# Patient Record
Sex: FEMALE | Race: WHITE | NOT HISPANIC OR LATINO | Employment: STUDENT | URBAN - METROPOLITAN AREA
[De-identification: names, ages, dates, MRNs, and addresses within clinical notes are randomized per-mention and may not be internally consistent; named-entity substitution may affect disease eponyms.]

---

## 2023-06-19 PROBLEM — Z86.39 HISTORY OF EARLY MENARCHE: Status: ACTIVE | Noted: 2023-06-19

## 2023-06-19 PROBLEM — E78.5 HYPERLIPIDEMIA: Status: ACTIVE | Noted: 2023-06-19

## 2023-06-19 PROBLEM — U07.1 COVID-19 VIRUS DETECTED: Status: ACTIVE | Noted: 2023-06-19

## 2023-06-19 PROBLEM — L20.9 ATOPIC DERMATITIS: Status: ACTIVE | Noted: 2023-06-19

## 2023-06-19 PROBLEM — J30.9 ALLERGIC RHINITIS: Status: ACTIVE | Noted: 2023-06-19

## 2023-07-13 PROBLEM — U07.1 COVID-19 VIRUS DETECTED: Status: RESOLVED | Noted: 2023-06-19 | Resolved: 2023-07-13

## 2023-07-13 RX ORDER — NORETHINDRONE AND ETHINYL ESTRADIOL 1 MG-35MCG
1 KIT ORAL DAILY
COMMUNITY
Start: 2020-09-28 | End: 2023-07-14 | Stop reason: SDUPTHER

## 2023-07-14 ENCOUNTER — OFFICE VISIT (OUTPATIENT)
Dept: PEDIATRICS | Facility: CLINIC | Age: 18
End: 2023-07-14
Payer: COMMERCIAL

## 2023-07-14 VITALS
WEIGHT: 112.9 LBS | DIASTOLIC BLOOD PRESSURE: 71 MMHG | SYSTOLIC BLOOD PRESSURE: 113 MMHG | BODY MASS INDEX: 18.14 KG/M2 | HEIGHT: 66 IN | HEART RATE: 65 BPM

## 2023-07-14 DIAGNOSIS — Z00.121 ENCOUNTER FOR ROUTINE CHILD HEALTH EXAMINATION WITH ABNORMAL FINDINGS: Primary | ICD-10-CM

## 2023-07-14 DIAGNOSIS — R61 HYPERHIDROSIS: ICD-10-CM

## 2023-07-14 DIAGNOSIS — K59.09 CHRONIC CONSTIPATION: ICD-10-CM

## 2023-07-14 PROCEDURE — 3008F BODY MASS INDEX DOCD: CPT | Performed by: PEDIATRICS

## 2023-07-14 PROCEDURE — 99395 PREV VISIT EST AGE 18-39: CPT | Performed by: PEDIATRICS

## 2023-07-14 PROCEDURE — 1036F TOBACCO NON-USER: CPT | Performed by: PEDIATRICS

## 2023-07-14 RX ORDER — NORETHINDRONE AND ETHINYL ESTRADIOL 1 MG-35MCG
1 KIT ORAL DAILY
Qty: 28 TABLET | Refills: 12 | Status: SHIPPED | OUTPATIENT
Start: 2023-07-14

## 2023-07-14 RX ORDER — PHENOL 1.4 %
AEROSOL, SPRAY (ML) MUCOUS MEMBRANE
COMMUNITY

## 2023-07-14 RX ORDER — POLYETHYLENE GLYCOL 3350 17 G/17G
17 POWDER, FOR SOLUTION ORAL DAILY PRN
Qty: 527 G | Refills: 2
Start: 2023-07-14 | End: 2023-10-15

## 2023-07-14 ASSESSMENT — PATIENT HEALTH QUESTIONNAIRE - PHQ9
2. FEELING DOWN, DEPRESSED OR HOPELESS: NOT AT ALL
1. LITTLE INTEREST OR PLEASURE IN DOING THINGS: NOT AT ALL
SUM OF ALL RESPONSES TO PHQ9 QUESTIONS 1 AND 2: 0

## 2023-07-14 NOTE — PROGRESS NOTES
"Link Bermudez is here her annual well visit.    Questions or concerns:  Constipation    Nutrition, Elimination, and Sleep:  Nutrition:  well-balanced diet, eats foods from each food group  Elimination:  She has a tendency to get constipated.  Sleep:  adequate, no snoring identified    Currently menstruating? yes; current menstrual pattern: regular every month without intermenstrual spotting    Social:  Peer relations:  no concerns  Family relations:  no concerns  School performance:  no concerns  Teen questionnaire:  reviewed  Activities:  work    Objective   /71   Pulse 65   Ht 1.686 m (5' 6.38\")   Wt 51.2 kg (112 lb 14.4 oz)   BMI 18.01 kg/m²   Physical Exam  Vitals reviewed.   Constitutional:       General: She is not in acute distress.     Appearance: Normal appearance. She is not ill-appearing.   HENT:      Head: Normocephalic and atraumatic.      Right Ear: Tympanic membrane, ear canal and external ear normal.      Left Ear: Tympanic membrane, ear canal and external ear normal.      Nose: Nose normal.      Mouth/Throat:      Mouth: Mucous membranes are moist.      Pharynx: Oropharynx is clear.   Eyes:      Extraocular Movements: Extraocular movements intact.      Conjunctiva/sclera: Conjunctivae normal.      Pupils: Pupils are equal, round, and reactive to light.   Neck:      Thyroid: No thyroid mass or thyromegaly.   Cardiovascular:      Rate and Rhythm: Normal rate and regular rhythm.      Pulses: Normal pulses.      Heart sounds: Normal heart sounds. No murmur heard.     No gallop.   Pulmonary:      Effort: Pulmonary effort is normal. No respiratory distress.      Breath sounds: Normal breath sounds.   Chest:   Breasts:     Eleuterio Score is 5.   Abdominal:      General: There is no distension.      Palpations: Abdomen is soft. There is no hepatomegaly, splenomegaly or mass.      Tenderness: There is no abdominal tenderness.      Hernia: No hernia is present.   Genitourinary:     Eleuterio " stage (genital): 5.   Musculoskeletal:         General: No swelling, deformity or signs of injury. Normal range of motion.      Cervical back: Normal range of motion and neck supple.      Thoracic back: No scoliosis.   Lymphadenopathy:      Comments: no significant lymphadenopathy > 1 cm   Skin:     General: Skin is warm and dry.   Neurological:      General: No focal deficit present.      Motor: No weakness.   Psychiatric:         Mood and Affect: Mood normal.         Thought Content: Thought content normal.          Assessment/Plan   1. Encounter for routine child health examination with abnormal findings  Alyacen 1/35, 28, 1-35 mg-mcg tablet      2. Body mass index less than 19        3. Chronic constipation        4. Hyperhidrosis  aluminum chloride (Drysol) 20 % external solution         Lara is a healthy and thriving adult.  - Follow-up in 1 month if constipation is not improving with use of Miralax  - Guidance regarding safety, nutrition, physical activity, and sleep reviewed.  - Social:  teenage questionnaire completed and reviewed.  Issues of smoking, vaping, substance use, sexuality, and mood discussed.    - Vaccines:  as documented  - Return in 1 year for annual well exam or sooner if concerns arise; GYN visit next year as well

## 2023-07-14 NOTE — PATIENT INSTRUCTIONS
Discussed proper use of Miralax, weaning as tolerated after having a soft, painless, BM daily for at least a month  Sitting routine with appropriately fit seat and feet on floor or foot rest x 10 min, ~ 20 min after meals once - twice daily  Increase fiber and water in diet  Follow-up in 1 month for recheck if needed

## 2023-07-27 DIAGNOSIS — Z00.121 ENCOUNTER FOR ROUTINE CHILD HEALTH EXAMINATION WITH ABNORMAL FINDINGS: ICD-10-CM

## 2023-07-27 RX ORDER — NORETHINDRONE AND ETHINYL ESTRADIOL 1 MG-35MCG
1 KIT ORAL DAILY
Qty: 28 TABLET | Refills: 11 | OUTPATIENT
Start: 2023-07-27

## 2024-01-25 ENCOUNTER — OFFICE VISIT (OUTPATIENT)
Dept: PEDIATRICS | Facility: CLINIC | Age: 19
End: 2024-01-25
Payer: COMMERCIAL

## 2024-01-25 VITALS — BODY MASS INDEX: 19.52 KG/M2 | TEMPERATURE: 98.1 F | WEIGHT: 122.31 LBS

## 2024-01-25 DIAGNOSIS — J01.90 ACUTE NON-RECURRENT SINUSITIS, UNSPECIFIED LOCATION: ICD-10-CM

## 2024-01-25 DIAGNOSIS — H66.003 NON-RECURRENT ACUTE SUPPURATIVE OTITIS MEDIA OF BOTH EARS WITHOUT SPONTANEOUS RUPTURE OF TYMPANIC MEMBRANES: Primary | ICD-10-CM

## 2024-01-25 PROCEDURE — 3008F BODY MASS INDEX DOCD: CPT | Performed by: PEDIATRICS

## 2024-01-25 PROCEDURE — 1036F TOBACCO NON-USER: CPT | Performed by: PEDIATRICS

## 2024-01-25 PROCEDURE — 99213 OFFICE O/P EST LOW 20 MIN: CPT | Performed by: PEDIATRICS

## 2024-01-25 RX ORDER — CEFDINIR 300 MG/1
300 CAPSULE ORAL 2 TIMES DAILY
Qty: 20 CAPSULE | Refills: 0 | Status: SHIPPED | OUTPATIENT
Start: 2024-01-25 | End: 2024-02-04

## 2024-01-25 NOTE — PROGRESS NOTES
Lara Contreras is a 18 y.o. who presents for Earache.      HPI  Nasal congestion for about a week.  New onset of right ear pain over the last day.  Intermittent tactile fever.    Objective   Temp 36.7 °C (98.1 °F)   Wt 55.5 kg (122 lb 5 oz)   BMI 19.52 kg/m²     Physical Exam  Constitutional:       Appearance: Normal appearance.   HENT:      Ears:      Comments: Both Tms red, purulent effusions     Nose: Congestion present.      Mouth/Throat:      Mouth: Mucous membranes are moist.   Eyes:      Conjunctiva/sclera: Conjunctivae normal.   Cardiovascular:      Rate and Rhythm: Normal rate and regular rhythm.      Heart sounds: Normal heart sounds.   Pulmonary:      Effort: Pulmonary effort is normal.      Breath sounds: Normal breath sounds.   Abdominal:      General: Bowel sounds are normal.      Tenderness: There is no abdominal tenderness.   Musculoskeletal:      Cervical back: Normal range of motion and neck supple.   Neurological:      Mental Status: She is alert.         Assessment/Plan   Lara has bilateral ear infections and I suspect she may have sinusitis as well.  She was prescribed Cefdinir.  Today we discussed a typical course of illness, symptomatic treatment, and signs of worsening/when to seek medical care.

## 2024-07-15 ENCOUNTER — APPOINTMENT (OUTPATIENT)
Dept: PEDIATRICS | Facility: CLINIC | Age: 19
End: 2024-07-15
Payer: COMMERCIAL

## 2024-07-21 DIAGNOSIS — Z00.121 ENCOUNTER FOR ROUTINE CHILD HEALTH EXAMINATION WITH ABNORMAL FINDINGS: ICD-10-CM

## 2024-07-22 RX ORDER — NORETHINDRONE AND ETHINYL ESTRADIOL 1 MG-35MCG
1 KIT ORAL DAILY
Qty: 28 TABLET | Refills: 12 | Status: SHIPPED | OUTPATIENT
Start: 2024-07-22

## 2024-08-07 ENCOUNTER — APPOINTMENT (OUTPATIENT)
Dept: PEDIATRICS | Facility: CLINIC | Age: 19
End: 2024-08-07
Payer: COMMERCIAL

## 2024-08-10 ENCOUNTER — OFFICE VISIT (OUTPATIENT)
Dept: PEDIATRICS | Facility: CLINIC | Age: 19
End: 2024-08-10
Payer: COMMERCIAL

## 2024-08-10 ENCOUNTER — LAB (OUTPATIENT)
Dept: LAB | Facility: LAB | Age: 19
End: 2024-08-10
Payer: COMMERCIAL

## 2024-08-10 VITALS
BODY MASS INDEX: 17.66 KG/M2 | HEIGHT: 67 IN | SYSTOLIC BLOOD PRESSURE: 111 MMHG | WEIGHT: 112.5 LBS | DIASTOLIC BLOOD PRESSURE: 68 MMHG | HEART RATE: 73 BPM

## 2024-08-10 DIAGNOSIS — E78.01 FAMILIAL HYPERCHOLESTEROLEMIA: ICD-10-CM

## 2024-08-10 DIAGNOSIS — Z00.01 ENCOUNTER FOR GENERAL ADULT MEDICAL EXAMINATION WITH ABNORMAL FINDINGS: ICD-10-CM

## 2024-08-10 DIAGNOSIS — Z23 ENCOUNTER FOR IMMUNIZATION: ICD-10-CM

## 2024-08-10 DIAGNOSIS — Z00.01 ENCOUNTER FOR GENERAL ADULT MEDICAL EXAMINATION WITH ABNORMAL FINDINGS: Primary | ICD-10-CM

## 2024-08-10 PROBLEM — L20.9 ATOPIC DERMATITIS: Status: RESOLVED | Noted: 2023-06-19 | Resolved: 2024-08-10

## 2024-08-10 LAB
CHOLEST SERPL-MCNC: 212 MG/DL (ref 0–199)
CHOLESTEROL/HDL RATIO: 2.3
ERYTHROCYTE [DISTWIDTH] IN BLOOD BY AUTOMATED COUNT: 12.3 % (ref 11.5–14.5)
HCT VFR BLD AUTO: 42.8 % (ref 36–46)
HDLC SERPL-MCNC: 92.6 MG/DL
HGB BLD-MCNC: 13.3 G/DL (ref 12–16)
LDLC SERPL CALC-MCNC: 91 MG/DL
MCH RBC QN AUTO: 28.1 PG (ref 26–34)
MCHC RBC AUTO-ENTMCNC: 31.1 G/DL (ref 32–36)
MCV RBC AUTO: 90 FL (ref 80–100)
NON HDL CHOLESTEROL: 119 MG/DL (ref 0–149)
NRBC BLD-RTO: 0 /100 WBCS (ref 0–0)
PLATELET # BLD AUTO: 280 X10*3/UL (ref 150–450)
RBC # BLD AUTO: 4.74 X10*6/UL (ref 4–5.2)
TRIGL SERPL-MCNC: 142 MG/DL (ref 0–149)
VLDL: 28 MG/DL (ref 0–40)
WBC # BLD AUTO: 6.7 X10*3/UL (ref 4.4–11.3)

## 2024-08-10 PROCEDURE — 90715 TDAP VACCINE 7 YRS/> IM: CPT | Performed by: PEDIATRICS

## 2024-08-10 PROCEDURE — 36415 COLL VENOUS BLD VENIPUNCTURE: CPT

## 2024-08-10 PROCEDURE — 99395 PREV VISIT EST AGE 18-39: CPT | Performed by: PEDIATRICS

## 2024-08-10 PROCEDURE — 90471 IMMUNIZATION ADMIN: CPT | Performed by: PEDIATRICS

## 2024-08-10 PROCEDURE — 96127 BRIEF EMOTIONAL/BEHAV ASSMT: CPT | Performed by: PEDIATRICS

## 2024-08-10 PROCEDURE — 3008F BODY MASS INDEX DOCD: CPT | Performed by: PEDIATRICS

## 2024-08-10 PROCEDURE — 85027 COMPLETE CBC AUTOMATED: CPT

## 2024-08-10 PROCEDURE — 1036F TOBACCO NON-USER: CPT | Performed by: PEDIATRICS

## 2024-08-10 PROCEDURE — 80061 LIPID PANEL: CPT

## 2024-08-10 RX ORDER — CETIRIZINE HYDROCHLORIDE 10 MG/1
10 TABLET ORAL DAILY PRN
COMMUNITY

## 2024-08-10 ASSESSMENT — PATIENT HEALTH QUESTIONNAIRE - PHQ9
5. POOR APPETITE OR OVEREATING: NOT AT ALL
6. FEELING BAD ABOUT YOURSELF - OR THAT YOU ARE A FAILURE OR HAVE LET YOURSELF OR YOUR FAMILY DOWN: NOT AT ALL
9. THOUGHTS THAT YOU WOULD BE BETTER OFF DEAD, OR OF HURTING YOURSELF: NOT AT ALL
3. TROUBLE FALLING OR STAYING ASLEEP: NOT AT ALL
4. FEELING TIRED OR HAVING LITTLE ENERGY: NOT AT ALL
7. TROUBLE CONCENTRATING ON THINGS, SUCH AS READING THE NEWSPAPER OR WATCHING TELEVISION: NOT AT ALL
8. MOVING OR SPEAKING SO SLOWLY THAT OTHER PEOPLE COULD HAVE NOTICED. OR THE OPPOSITE, BEING SO FIGETY OR RESTLESS THAT YOU HAVE BEEN MOVING AROUND A LOT MORE THAN USUAL: NOT AT ALL
1. LITTLE INTEREST OR PLEASURE IN DOING THINGS: NOT AT ALL
1. LITTLE INTEREST OR PLEASURE IN DOING THINGS: NOT AT ALL
10. IF YOU CHECKED OFF ANY PROBLEMS, HOW DIFFICULT HAVE THESE PROBLEMS MADE IT FOR YOU TO DO YOUR WORK, TAKE CARE OF THINGS AT HOME, OR GET ALONG WITH OTHER PEOPLE: NOT DIFFICULT AT ALL
6. FEELING BAD ABOUT YOURSELF - OR THAT YOU ARE A FAILURE OR HAVE LET YOURSELF OR YOUR FAMILY DOWN: NOT AT ALL
2. FEELING DOWN, DEPRESSED OR HOPELESS: NOT AT ALL
7. TROUBLE CONCENTRATING ON THINGS, SUCH AS READING THE NEWSPAPER OR WATCHING TELEVISION: NOT AT ALL
5. POOR APPETITE OR OVEREATING: NOT AT ALL
SUM OF ALL RESPONSES TO PHQ QUESTIONS 1-9: 0
10. IF YOU CHECKED OFF ANY PROBLEMS, HOW DIFFICULT HAVE THESE PROBLEMS MADE IT FOR YOU TO DO YOUR WORK, TAKE CARE OF THINGS AT HOME, OR GET ALONG WITH OTHER PEOPLE: NOT DIFFICULT AT ALL
2. FEELING DOWN, DEPRESSED OR HOPELESS: NOT AT ALL
8. MOVING OR SPEAKING SO SLOWLY THAT OTHER PEOPLE COULD HAVE NOTICED. OR THE OPPOSITE - BEING SO FIDGETY OR RESTLESS THAT YOU HAVE BEEN MOVING AROUND A LOT MORE THAN USUAL: NOT AT ALL
SUM OF ALL RESPONSES TO PHQ9 QUESTIONS 1 & 2: 0
4. FEELING TIRED OR HAVING LITTLE ENERGY: NOT AT ALL
9. THOUGHTS THAT YOU WOULD BE BETTER OFF DEAD, OR OF HURTING YOURSELF: NOT AT ALL
3. TROUBLE FALLING OR STAYING ASLEEP OR SLEEPING TOO MUCH: NOT AT ALL

## 2024-08-10 NOTE — PROGRESS NOTES
"Link Bermudez is here her annual well visit.    Questions or concerns:  either none, or only commonly asked age-specific questions    Nutrition, Elimination, and Sleep:  Nutrition:  well-balanced diet  Elimination:  normal frequency and quality of stool  Sleep:  adequate, no snoring identified    Currently menstruating? 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 In the past few weeks, have you wished you were dead? N   2. In the past few weeks, have you felt that you or your family would be better off if you were dead? N   3. In the past week, have you been having thoughts about killing yourself? N   4. Have you ever tried to kill yourself? N   Calculated Risk Score No intervention is necessary     Objective   /68   Pulse 73   Ht 1.705 m (5' 7.13\")   Wt 51 kg (112 lb 8 oz)   BMI 17.55 kg/m²   Physical Exam  Vitals reviewed.   Constitutional:       General: She is not in acute distress.     Appearance: Normal appearance. She is not ill-appearing.   HENT:      Head: Normocephalic and atraumatic.      Right Ear: Tympanic membrane, ear canal and external ear normal.      Left Ear: Tympanic membrane, ear canal and external ear normal.      Nose: Nose normal.      Mouth/Throat:      Mouth: Mucous membranes are moist.      Pharynx: Oropharynx is clear.   Eyes:      Extraocular Movements: Extraocular movements intact.      Conjunctiva/sclera: Conjunctivae normal.      Pupils: Pupils are equal, round, and reactive to light.   Neck:      Thyroid: No thyroid mass or thyromegaly.   Cardiovascular:      Rate and Rhythm: Normal rate and regular " rhythm.      Pulses: Normal pulses.      Heart sounds: Normal heart sounds. No murmur heard.     No gallop.   Pulmonary:      Effort: Pulmonary effort is normal. No respiratory distress.      Breath sounds: Normal breath sounds.   Abdominal:      General: There is no distension.      Palpations: Abdomen is soft. There is no hepatomegaly, splenomegaly or mass.      Tenderness: There is no abdominal tenderness.      Hernia: No hernia is present.   Musculoskeletal:         General: No swelling, deformity or signs of injury. Normal range of motion.      Cervical back: Normal range of motion and neck supple.      Thoracic back: No scoliosis.   Lymphadenopathy:      Comments: no significant lymphadenopathy > 1 cm   Skin:     General: Skin is warm and dry.   Neurological:      General: No focal deficit present.      Motor: No weakness.   Psychiatric:         Mood and Affect: Mood normal.         Thought Content: Thought content normal.     Assessment/Plan   Problem List Items Addressed This Visit       Hyperlipidemia    Relevant Orders    Lipid Panel     Other Visit Diagnoses       Encounter for general adult medical examination with abnormal findings    -  Primary    Relevant Orders    CBC    Encounter for immunization        Relevant Orders    Tdap vaccine, age 7 years and older (Completed)    Adult BMI <19 kg/sq m            Lara is a healthy and thriving adult.    - Guidance regarding safety, nutrition, physical activity, and sleep reviewed.  - Social:  teenage questionnaire completed and reviewed.  Issues of smoking, vaping, substance use, sexuality, and mood discussed.    - Vaccines:  as documented  - Referred to GYN for routine care; follow-up in 1 year for annual well exam with an internist or sooner with me if concerns arise

## 2024-08-10 NOTE — PATIENT INSTRUCTIONS
GYNECOLOGISTS:     [phone]  Dr. Liberty Guzman - [facility]  Dr. Marielle Ellison - [facility]  Dr. Catie Miller - [facility]  Dr. Corinne Bazella - [facility]  Dr. Noreen Boggs - [facility]  Dr. Ayesah Harvey - [place]    [phone]  Dr. Latosha Lemos, [place]    [phone]  Dr. lAmaz Garces - [place]    [phone]  Dr. Bisi Sow - [facility]  Dr. Ale Wang  [phone]     [facility]  [phone]  Dr. Lucie Jones and partners ([place])    [phone]  Dr. Hodan Mcadams and partners ([place])    [phone]  Dr. Brtitney Munson ([facility])

## 2024-08-12 ENCOUNTER — TELEPHONE (OUTPATIENT)
Dept: PEDIATRICS | Facility: CLINIC | Age: 19
End: 2024-08-12
Payer: COMMERCIAL

## 2024-08-12 PROBLEM — Z86.39 HISTORY OF HYPERLIPIDEMIA: Status: ACTIVE | Noted: 2023-06-19

## 2024-08-12 NOTE — RESULT ENCOUNTER NOTE
"Please let her know that her blood count is normal and her lipid panel is improved normal.  The total is high only because her \"good\" cholesterol (HDL) is great.  Her bad cholesterol is normal."

## 2024-08-12 NOTE — TELEPHONE ENCOUNTER
"----- Message from Brittney Larkin sent at 8/12/2024 11:33 AM EDT -----  Please let her know that her blood count is normal and her lipid panel is improved normal.  The total is high only because her \"good\" cholesterol (HDL) is great.  Her bad cholesterol is normal.  "

## 2024-08-16 ENCOUNTER — APPOINTMENT (OUTPATIENT)
Dept: PEDIATRICS | Facility: CLINIC | Age: 19
End: 2024-08-16
Payer: COMMERCIAL

## 2024-08-19 ENCOUNTER — APPOINTMENT (OUTPATIENT)
Dept: PEDIATRICS | Facility: CLINIC | Age: 19
End: 2024-08-19
Payer: COMMERCIAL

## 2025-05-29 ENCOUNTER — TELEPHONE (OUTPATIENT)
Dept: PEDIATRICS | Facility: CLINIC | Age: 20
End: 2025-05-29
Payer: COMMERCIAL

## 2025-05-29 NOTE — TELEPHONE ENCOUNTER
Lara left a . Lara would like to know if you will refill her prescription until December 2025. This is the earliest that she will be able to see an adult physician. Please advise. Thanks     [phone]

## 2025-06-02 DIAGNOSIS — Z00.121 ENCOUNTER FOR ROUTINE CHILD HEALTH EXAMINATION WITH ABNORMAL FINDINGS: ICD-10-CM

## 2025-06-02 RX ORDER — NORETHINDRONE AND ETHINYL ESTRADIOL 1 MG-35MCG
1 KIT ORAL DAILY
Qty: 28 TABLET | Refills: 12 | Status: SHIPPED | OUTPATIENT
Start: 2025-06-02